# Patient Record
Sex: FEMALE | Race: WHITE
[De-identification: names, ages, dates, MRNs, and addresses within clinical notes are randomized per-mention and may not be internally consistent; named-entity substitution may affect disease eponyms.]

---

## 2018-06-25 ENCOUNTER — HOSPITAL ENCOUNTER (EMERGENCY)
Dept: HOSPITAL 80 - FED | Age: 68
Discharge: HOME | End: 2018-06-25
Payer: COMMERCIAL

## 2018-06-25 VITALS — DIASTOLIC BLOOD PRESSURE: 51 MMHG | SYSTOLIC BLOOD PRESSURE: 109 MMHG

## 2018-06-25 DIAGNOSIS — J18.9: Primary | ICD-10-CM

## 2018-06-25 DIAGNOSIS — E86.9: ICD-10-CM

## 2018-06-25 LAB
INR PPP: 1.18 (ref 0.83–1.16)
PLATELET # BLD: 259 10^3/UL (ref 150–400)
PROTHROMBIN TIME: 15.2 SEC (ref 12–15)

## 2018-06-25 PROCEDURE — 93005 ELECTROCARDIOGRAM TRACING: CPT

## 2018-06-25 PROCEDURE — 71046 X-RAY EXAM CHEST 2 VIEWS: CPT

## 2018-06-25 PROCEDURE — 99285 EMERGENCY DEPT VISIT HI MDM: CPT

## 2018-06-25 PROCEDURE — 96365 THER/PROPH/DIAG IV INF INIT: CPT

## 2018-06-25 RX ADMIN — SODIUM CHLORIDE SCH MLS: 900 INJECTION, SOLUTION INTRAVENOUS at 19:49

## 2018-06-25 RX ADMIN — SODIUM CHLORIDE SCH MLS: 900 INJECTION, SOLUTION INTRAVENOUS at 18:35

## 2018-06-25 RX ADMIN — SODIUM CHLORIDE SCH: 900 INJECTION, SOLUTION INTRAVENOUS at 20:38

## 2018-06-25 NOTE — CPEKG
Heart Rate: 83

RR Interval: 723

P-R Interval: 136

QRSD Interval: 92

QT Interval: 380

QTC Interval: 447

P Axis: 11

QRS Axis: -23

T Wave Axis: 52

EKG Severity - OTHERWISE NORMAL ECG -

EKG Impression: SINUS RHYTHM

EKG Impression: BORDERLINE LEFT AXIS DEVIATION

Electronically Signed By: Micah Ibrahim 25-Jun-2018 20:41:07

## 2018-06-25 NOTE — EDPHY
H & P


Stated Complaint: cough/fever


Time Seen by Provider: 06/25/18 16:18


HPI/ROS: 





CHIEF COMPLAINT:  Cough





HISTORY OF PRESENT ILLNESS:  The patient is a 67-year-old healthy female who 

comes to the emergency department complaining of a cough for the last week as 

well as a low-grade fever.  She denies shortness of breath.  She denies any 

cardiac or pulmonary history.  Her granddaughter is also been sick with a 

cough.  No chest pain.  No abdominal pain.








REVIEW OF SYSTEMS:


Constitutional:  denies: chills, fever, recent illness, recent injury


EENTM: denies: blurred vision, double vision, nose congestion


Respiratory:  See HPI


Cardiac: denies: chest pain, irregular heart rate, lightheadedness, palpitations


Gastrointestinal/Abdominal: denies: abdominal pain, diarrhea, nausea, vomiting, 

blood streaked stools


Genitourinary: denies: dysuria, frequency, hematuria, pain


Musculoskeletal: denies: joint pain, muscle pain


Skin: denies: lesions, rash, jaundice, bruising


Neurological: denies: headache, numbness, paresthesia, tingling, dizziness, 

weakness


Hematologic/Lymphatic: denies: blood clots, easy bleeding, easy bruising


Immunologic/allergic: denies: HIV/AIDS, transplant








EXAM:


GENERAL:  Well-appearing, well-nourished and in no acute distress.


HEAD:  Atraumatic, normocephalic.


EYES:  Pupils equal round and reactive to light, extraocular movements intact, 

sclera anicteric, conjunctiva are normal.


ENT:  TMs normal, nares patent, oropharynx clear without exudates.  Moist 

mucous membranes.


NECK:  Normal range of motion, supple without lymphadenopathy or JVD.


LUNGS:  Left lower lobe rhonchi, cough


HEART:  Regular rate and rhythm without murmurs, rubs or gallops.


ABDOMEN:  Soft, nontender, normoactive bowel sounds.  No guarding, no rebound.  

No masses appreciated.


BACK:  No CVA tenderness, no spinal tenderness, step-offs or deformities


EXTREMITIES:  Normal range of motion, no pitting or edema.  No clubbing or 

cyanosis.


NEUROLOGICAL:  Cranial nerves II through XII grossly intact.  Normal speech, 

normal gait.  5/5 strength, normal movement in all extremities, normal sensation


PSYCH:  Normal mood, normal affect.


SKIN:  Warm, dry, normal turgor, no visible rashes or lesions.








Source: Patient


Exam Limitations: No limitations





- Personal History


Current Tetanus Diphtheria and Acellular Pertussis (TDAP): No





- Medical/Surgical History


Hx Asthma: No


Hx Chronic Respiratory Disease: No


Hx Diabetes: No


Hx Cardiac Disease: No


Hx Renal Disease: No


Hx Cirrhosis: No


Hx Alcoholism: No


Hx HIV/AIDS: No


Hx Splenectomy or Spleen Trauma: No


Other PMH: cervical spine surgery





- Family History


Significant Family History: No pertinent family hx





- Social History


Smoking Status: Never smoked


Alcohol Use: Sober


Drug Use: None


Constitutional: 


 Initial Vital Signs











Temperature (C)  37.9 C   06/25/18 16:05


 


Heart Rate  82   06/25/18 16:05


 


Respiratory Rate  16   06/25/18 16:05


 


Blood Pressure  139/85 H  06/25/18 16:05


 


O2 Sat (%)  95   06/25/18 16:05








 











O2 Delivery Mode               Room Air














Allergies/Adverse Reactions: 


 





No Known Allergies Allergy (Verified 06/25/18 16:04)


 








Home Medications: 














 Medication  Instructions  Recorded


 


Acetaminophen/Codeine 300/30Mg 1 - 2 each PO Q6 PRN #14 tab 06/25/18





[Tylenol #3 (RX)]  


 


levOFLOXACIN [levAQUIN] 750 mg PO DAILY #10 tab 06/25/18


 


traMADol  06/25/18


 


traZODone  06/25/18














Medical Decision Making





- Diagnostics


EKG Interpretation: 





An EKG obtained and was read and documented in trace view.  Please see trace 

view for full reading and report.  Sinus rhythm, no acute ischemic changes 


Imaging Results: 


 Imaging Impressions





Chest X-Ray  06/25/18 16:25


Impression: Reticulation and haziness at the lung bases bilaterally and in the 

right suprahilar region may represent pneumonitis or developing pneumonia. 

Recommend correlation.


 


 


 











Imaging: Discussed imaging studies w/ On call Radiologist


ED Course/Re-evaluation: 





4:50 p.m. We discussed the patient's x-ray and lab work.  It is reassuring.  

Feel that she is doing well enough to be discharged.  I would like to give her 

some potassium 1st and recheck it.  She is asking for Tylenol with codeine.





8:30 p.m. the patient's repeat potassium is improved.  Was is after 10 mEq.  I 

will give her the rest orally.  We discussed follow-up and indications for 

returning.  She is happy with this plan.


Differential Diagnosis: 





Partial list of the Differential diagnosis considered include but were not 

limited to;  pneumonia, electrolyte abnormality, bronchitis and although 

unlikely based on the history and physical exam, I also considered COPD, 

pneumothorax, acute coronary disease.  I discussed these differential diagnoses 

and the plan with the patient as well as the usual and expected course.  The 

patient understands that the diagnosis is provisional and that in medicine we 

are not always correct and that further workup is often warranted.  Usual and 

customary warnings were given.  All of the patient's questions were answered.  

The patient was instructed to return to the emergency department should the 

symptoms at all worsen or return, otherwise to followup with the physician as 

we discussed.





- Data Points


Laboratory Results: 


 Laboratory Results





 06/25/18 16:50 





 06/25/18 16:50 





 











  06/25/18 06/25/18 06/25/18





  20:36 16:50 16:50


 


WBC      





    


 


RBC      





    


 


Hgb      





    


 


POC Hgb  12.2 gm/dL L gm/dL    





   (12.6-16.3)   


 


Hct      





    


 


POC Hct  36 % L %    





   (38-47)   


 


MCV      





    


 


MCH      





    


 


MCHC      





    


 


RDW      





    


 


Plt Count      





    


 


MPV      





    


 


Neut % (Auto)      





    


 


Lymph % (Auto)      





    


 


Mono % (Auto)      





    


 


Eos % (Auto)      





    


 


Baso % (Auto)      





    


 


Nucleat RBC Rel Count      





    


 


Absolute Neuts (auto)      





    


 


Absolute Lymphs (auto)      





    


 


Absolute Monos (auto)      





    


 


Absolute Eos (auto)      





    


 


Absolute Basos (auto)      





    


 


Absolute Nucleated RBC      





    


 


Immature Gran %      





    


 


Immature Gran #      





    


 


PT    15.2 SEC H SEC  





    (12.0-15.0)  


 


INR    1.18  H   





    (0.83-1.16)  


 


APTT    29.9 SEC SEC  





    (23.0-38.0)  


 


VBG Lactic Acid      1.9 mmol/L mmol/L





     (0.7-2.1) 


 


POC Sodium  137 mEq/L mEq/L    





   (135-145)   


 


Sodium      





    


 


POC Potassium  3.2 mEq/L L mEq/L    





   (3.3-5.0)   


 


Potassium      





    


 


POC Chloride  98 mEq/L mEq/L    





   ()   


 


Chloride      





    


 


Carbon Dioxide      





    


 


Anion Gap      





    


 


POC BUN  < 3 mg/dL L mg/dL    





   (7-23)   


 


BUN      





    


 


Creatinine      





    


 


POC Creatinine  0.6 mg/dL mg/dL    





   (0.6-1.0)   


 


Estimated GFR      





    


 


Glucose      





    


 


POC Glucose  119 mg/dL H mg/dL    





   ()   


 


Calcium      





    


 


Total Bilirubin      





    














  06/25/18 06/25/18





  16:50 16:50


 


WBC    10.96 10^3/uL H 10^3/uL





    (3.80-9.50) 


 


RBC    4.15 10^6/uL L 10^6/uL





    (4.18-5.33) 


 


Hgb    13.7 g/dL g/dL





    (12.6-16.3) 


 


POC Hgb    





   


 


Hct    38.5 % %





    (38.0-47.0) 


 


POC Hct    





   


 


MCV    92.8 fL fL





    (81.5-99.8) 


 


MCH    33.0 pg pg





    (27.9-34.1) 


 


MCHC    35.6 g/dL g/dL





    (32.4-36.7) 


 


RDW    11.7 % %





    (11.5-15.2) 


 


Plt Count    259 10^3/uL 10^3/uL





    (150-400) 


 


MPV    9.8 fL fL





    (8.7-11.7) 


 


Neut % (Auto)    81.2 % H %





    (39.3-74.2) 


 


Lymph % (Auto)    11.5 % L %





    (15.0-45.0) 


 


Mono % (Auto)    6.6 % %





    (4.5-13.0) 


 


Eos % (Auto)    0.1 % L %





    (0.6-7.6) 


 


Baso % (Auto)    0.2 % L %





    (0.3-1.7) 


 


Nucleat RBC Rel Count    0.0 % %





    (0.0-0.2) 


 


Absolute Neuts (auto)    8.91 10^3/uL H 10^3/uL





    (1.70-6.50) 


 


Absolute Lymphs (auto)    1.26 10^3/uL 10^3/uL





    (1.00-3.00) 


 


Absolute Monos (auto)    0.72 10^3/uL 10^3/uL





    (0.30-0.80) 


 


Absolute Eos (auto)    0.01 10^3/uL L 10^3/uL





    (0.03-0.40) 


 


Absolute Basos (auto)    0.02 10^3/uL 10^3/uL





    (0.02-0.10) 


 


Absolute Nucleated RBC    0.00 10^3/uL 10^3/uL





    (0-0.01) 


 


Immature Gran %    0.4 % %





    (0.0-1.1) 


 


Immature Gran #    0.04 10^3/uL 10^3/uL





    (0.00-0.10) 


 


PT    





   


 


INR    





   


 


APTT    





   


 


VBG Lactic Acid    





   


 


POC Sodium    





   


 


Sodium  136 mEq/L mEq/L  





   (135-145)  


 


POC Potassium    





   


 


Potassium  2.9 mEq/L L mEq/L  





   (3.3-5.0)  


 


POC Chloride    





   


 


Chloride  93 mEq/L L mEq/L  





   ()  


 


Carbon Dioxide  28 mEq/l mEq/l  





   (22-31)  


 


Anion Gap  15 mEq/L mEq/L  





   (8-16)  


 


POC BUN    





   


 


BUN  7 mg/dL mg/dL  





   (7-23)  


 


Creatinine  0.7 mg/dL mg/dL  





   (0.6-1.0)  


 


POC Creatinine    





   


 


Estimated GFR  > 60   





   


 


Glucose  101 mg/dL H mg/dL  





   ()  


 


POC Glucose    





   


 


Calcium  9.3 mg/dL mg/dL  





   (8.5-10.4)  


 


Total Bilirubin  1.5 mg/dL H mg/dL  





   (0.1-1.4)  











Medications Given: 


 





Potassium Chloride 10 meq/ (Sodium Chloride)  100 mls @ 100 mls/hr IV Q1H RIGOBERTO


   Stop: 06/25/18 21:59


   Last Admin: 06/25/18 20:38 Dose:  Not Given





Discontinued Medications





Acetaminophen/Codeine Phosphate (Tylenol #3)  2 tab PO EDNOW ONE


   Stop: 06/25/18 17:41


   Last Admin: 06/25/18 17:49 Dose:  2 tab


Albuterol/Ipratropium (Duoneb)  3 ml IH EDNOW ONE


   Stop: 06/25/18 16:25


   Last Admin: 06/25/18 16:46 Dose:  3 ml


Sodium Chloride (Ns)  1,000 mls @ 0 mls/hr IV ONCE ONE; Wide Open


   PRN Reason: Protocol


   Stop: 06/25/18 16:25


   Last Admin: 06/25/18 16:45 Dose:  1,000 mls


Levofloxacin/Dextrose (Levaquin 750 Mg (Premix))  150 mls @ 100 mls/hr IV EDNOW 

ONE


   PRN Reason: Protocol


   Stop: 06/25/18 17:53


   Last Admin: 06/25/18 16:45 Dose:  150 mls





Point of Care Test Results: 


 Chemistry











  06/25/18





  20:36


 


POC Sodium  137 mEq/L mEq/L





   (135-145) 


 


POC Potassium  3.2 mEq/L L mEq/L





   (3.3-5.0) 


 


POC Chloride  98 mEq/L mEq/L





   () 


 


POC BUN  < 3 mg/dL L mg/dL





   (7-23) 


 


POC Creatinine  0.6 mg/dL mg/dL





   (0.6-1.0) 


 


POC Glucose  119 mg/dL H mg/dL





   () 








 ISTAT H&H











  06/25/18





  20:36


 


POC Hgb  12.2 gm/dL L gm/dL





   (12.6-16.3) 


 


POC Hct  36 % L %





   (38-47) 














Departure





- Departure


Disposition: Home, Routine, Self-Care


Clinical Impression: 


 Pneumonia





Condition: Fair


Instructions:  Bacterial Pneumonia (ED), Hydrocodone/Acetaminophen (By mouth)


Referrals: 


NONE *PRIMARY CARE P,. [Primary Care Provider] - As per Instructions


Kayla Naranjo MD [Medical Doctor] - As per Instructions


Prescriptions: 


Acetaminophen/Codeine 300/30Mg [Tylenol #3 (RX)] 1 - 2 each PO Q6 PRN #14 tab


 PRN Reason: *Cough


levOFLOXACIN [levAQUIN] 750 mg PO DAILY #10 tab